# Patient Record
(demographics unavailable — no encounter records)

---

## 2024-10-16 NOTE — HISTORY OF PRESENT ILLNESS
[FreeTextEntry1] : ***Oct. 16, 2024*** Sydney feels well overall denies new complaints she is presently on Mounjaro 15mg qwk, metformin sporadically does endorse gi side effects, Tresiba 8u qam.  She is wearing a maria d PRO Date of download:  10/16/2024   Diabetes Medications and Dosage: as above   Indication for CGMS: verify a change in the treatment regimen, identify periods of hypoglycemia/ hyperglycemia.   Modal day report: pattern.   Pt with HYPO  0% of the time ( NONE below 54),  15% in target range   Hyperglycemia:  85% elevation including 29%   Identified issues: carbohydrate counting   dates analyzed:  09/27/2024 - 10/10/2024  Reports relatively good exercise regimen but is less than she usually does.   Lab review A1c 9.2%, HDL 49, LDL 80, Cr. 76, egfr  94,    HISTORY OF PRESENT ILLNESS ***Sept. 24, 2024*** Ms. CAMPBELL was diagnosed with Diabetes Mellitus Type 2 unsure of when she was diagnosed with T2DM (Earliest record 9.5% 2021) and arrives today for initial evaluation of glycemic control.   She reports history of HTN, dyslipidemia, and denies CAD, known complications of retinopathy, nephropathy, or neuropathy. She is presently on Mounjaro 15 mg qwk, Metformin 500 mg  takes sporadically (If I don't eat I don't need to take it.), Rosuvastatin 5mg qhs,  She was previously on Farxiga 5 mg qd it was discontinued due to immediate yeast infection.    Blood sugars are not checked.    Hypoglycemia frequency: Pt denies subjective HYPOs   Fingerstick glucose in the office today is 202 mg/dL  fasting.   Diet: not following ADA.    Exercise: WalkFit lupe 26 minutes 3-4x week,  (9 games a week)   Lab review: April 2024:  mg/dL, HDL 46 mg/dL Cr. 88, eGFR 78, A1c 8.4%   Last dilated eye -  Last podiatry visit  -  Last cardiology evaluation -  Last stress test -  Last 2-D Echo -  Last nephrology evaluation -  Last neurology evaluation-

## 2024-10-16 NOTE — PROCEDURE
[FreeTextEntry1] : General: In no acute distress.  Head: Normocephalic  Skin: Normal, no bruises. There are no cushingoid features  Eyes: No pallor, lid lag or orbitopathy.  Neck: No audible carotid bruit  Thyroid: palpable, non-tender. No audible bruit. Not enlarged. No palpable nodules. Pembertons negative  Lymph nodes: no palpable neck lymphadenopathy.  Chest: Lungs clear to auscultation  Heart: S1, S2, no murmurs  Abdomen: No tenderness, no masses, no striae  Neurological: Deep tendon reflexes symmetrical, 2+ (biceps, brachioradialis, patellar).  Lower Extremities: No edema, no cyanosis  Foot exam: No ulcers, no onychomycotic nail changes. Good pedal pulses. 10-g [place]-Khoa monofilament testing is normal bilaterally. Vibratory sensation with 128-Hz tuning fork is preserved bilaterally.

## 2024-10-16 NOTE — ASSESSMENT
Pt A&Ox2, incontinent of urine, external catheter in place. Very pleasant, following commands, max assist for transfer to chair stand and pivot. Continued to work with pt sitting and standing from chair to stedy, pt needed moderate assist for sit to stand with stedy.     Pt complained of pain to right wrist, ice placed and wrapped with ace bandage. Minimal edema noted, family stated looked a lot better. No significant weakness noted to left side pt granddaughter though his strength was more equal and closer to baseline.     Bath given, pt able to follow commands with washing self and oral care. Continuing to encourage pt to try and feed self. Tylenol given for generalized pain. Pt stated he had arthritis all over. Pt sitting up in chair with all safety measures in place, call device within reach and family at bedside.        [FreeTextEntry1] : T2DM uncontrolled, A1c 9. -LILLIE PRO results discussed today -Begin Lillie 3 sample provided, lupe downloaded -Blood work results discussed today -Increase Tresiba 20u qhs -Begin Metformin ER 500mg 2 tablets once a day   -Continue Mounjaro 15mg qwk -We discussed avoidance recognition and treatment of hypoglycemia -Continue rosuvastatin 5mg qhs  rtc 1 month     Diabetes Counseling: The patient was counseled on diabetes foot care, long term vascular complications of diabetes, carbohydrate consistent diet, importance of diet and exercise to improve glycemic control, achieve weight loss and improve cardiovascular health, exercise/effect on glucose, hypoglycemia management, glucagon use, ketone testing, action and use of short and long-acting insulin, self-monitoring of blood glucose, insulin self-administration, injection technique, storage, and sharps disposal and sick-day management.  Patient was referred to ophthalmology for retinopathy screening. Risks and benefits of diabetic medications were discussed.

## 2024-11-15 NOTE — PROCEDURE
[FreeTextEntry1] : General: In no acute distress.  Head: Normocephalic  Skin: Normal, no bruises. There are no cushingoid features  Eyes: No pallor, lid lag or orbitopathy.  Neck: No audible carotid bruit  Thyroid: palpable, non-tender. No audible bruit. Not enlarged. No palpable nodules. Pembertons negative  Lymph nodes: no palpable neck lymphadenopathy.  Chest: Lungs clear to auscultation  Heart: S1, S2, no murmurs  Abdomen: No tenderness, no masses, no striae  Neurological: Deep tendon reflexes symmetrical, 2+ (biceps, brachioradialis, patellar).  Lower Extremities: No edema, no cyanosis  Foot exam: No ulcers, no onychomycotic nail changes. Good pedal pulses. 10-g [place]-Khoa monofilament testing is normal bilaterally. Vibratory sensation with 128-Hz tuning fork is preserved bilaterally.

## 2024-11-15 NOTE — ASSESSMENT
[FreeTextEntry1] : T2DM uncontrolled, major improvement , not at goal -LILLIE 3 - 30mins post meal post meal exercise shows a clear and significant benefit on cgm data   -I advised post meal exercise lunch (as little as 10 minutes brisk walk) and dinner -Increase Tresiba 24u qhs -Increase Metformin ER 500mg 3 tablets once a day   -Continue Mounjaro 15mg qwk -We discussed avoidance recognition and treatment of hypoglycemia -Continue rosuvastatin 5mg qhs  rtc 2 months     Diabetes Counseling: The patient was counseled on diabetes foot care, long term vascular complications of diabetes, carbohydrate consistent diet, importance of diet and exercise to improve glycemic control, achieve weight loss and improve cardiovascular health, exercise/effect on glucose, hypoglycemia management, glucagon use, ketone testing, action and use of short and long-acting insulin, self-monitoring of blood glucose, insulin self-administration, injection technique, storage, and sharps disposal and sick-day management.  Patient was referred to ophthalmology for retinopathy screening. Risks and benefits of diabetic medications were discussed.

## 2024-11-15 NOTE — HISTORY OF PRESENT ILLNESS
[FreeTextEntry1] : ***Nov. 15, 2026*** Sydney feels well overall denies new complaints she is presently on Treisba 20 u qam, mounjaro 15mg qwk metformin 500mg 2-3 tablets daily with dinner, She is wearing a maria d 3 Date of download:  11/15/2024  Diabetes Medications and Dosage: as above  Indication for CGMS: verify a change in the treatment regimen, identify periods of hypoglycemia/ hyperglycemia.  Modal day report: pattern.  Pt with HYPO  0% of the time ( NONE below 54),  67% in target range  Hyperglycemia:  31% elevation including 2% > 250mg/dL  Identified issues: carbohydrate counting  dates analyzed:  11/02/2024 - 11/15/2024  ***Oct. 16, 2024*** Sydney feels well overall denies new complaints she is presently on Mounjaro 15mg qwk, metformin sporadically does endorse gi side effects, Tresiba 8u qam.  She is wearing a maria d PRO Date of download:  10/16/2024   Diabetes Medications and Dosage: as above   Indication for CGMS: verify a change in the treatment regimen, identify periods of hypoglycemia/ hyperglycemia.   Modal day report: pattern.   Pt with HYPO  0% of the time ( NONE below 54),  15% in target range   Hyperglycemia:  85% elevation including 29%   Identified issues: carbohydrate counting   dates analyzed:  09/27/2024 - 10/10/2024  Reports relatively good exercise regimen but is less than she usually does.   Lab review A1c 9.2%, HDL 49, LDL 80, Cr. 76, egfr  94,    HISTORY OF PRESENT ILLNESS ***Sept. 24, 2024*** Ms. CAMPBELL was diagnosed with Diabetes Mellitus Type 2 unsure of when she was diagnosed with T2DM (Earliest record 9.5% 2021) and arrives today for initial evaluation of glycemic control.   She reports history of HTN, dyslipidemia, and denies CAD, known complications of retinopathy, nephropathy, or neuropathy. She is presently on Mounjaro 15 mg qwk, Metformin 500 mg  takes sporadically (If I don't eat I don't need to take it.), Rosuvastatin 5mg qhs,  She was previously on Farxiga 5 mg qd it was discontinued due to immediate yeast infection.    Blood sugars are not checked.    Hypoglycemia frequency: Pt denies subjective HYPOs   Fingerstick glucose in the office today is 202 mg/dL  fasting.   Diet: not following ADA.    Exercise: WalkFit lupe 26 minutes 3-4x week,  (9 games a week)   Lab review: April 2024:  mg/dL, HDL 46 mg/dL Cr. 88, eGFR 78, A1c 8.4%   Last dilated eye -  Last podiatry visit  -  Last cardiology evaluation -  Last stress test -  Last 2-D Echo -  Last nephrology evaluation -  Last neurology evaluation-

## 2024-12-12 NOTE — ASSESSMENT
[FreeTextEntry1] : 2 DM : now followed by Endo on Tresiba in add to Mounjaro and metformin( SGLT2 gave her yeast infections immediately) , + proteinuria  3 htn controlled 4.asthma: controlled  5.hypercalcemia off her Ca needs repeat  6 HLD: now on  crestor 5 and check with follow up Ca.  7 JOY on oral appliance   4 mos  med renewed

## 2024-12-12 NOTE — HISTORY OF PRESENT ILLNESS
[FreeTextEntry1] : Here for follow up for her DM  [de-identified] : Now seeing Endo, now on insulin /tresibs, wears a CGM, sugars are better, now 154 at this moment.

## 2025-01-13 NOTE — ASSESSMENT
[FreeTextEntry1] : T2DM uncontrolled, poor diet -LILLIE 3 -I advised post meal exercise lunch (as little as 10 minutes brisk walk) and dinner -Increase Tresiba 27u qhs -Metformin ER 500mg 3 tablets once a day   - Mounjaro 15mg qwk -Continue rosuvastatin 5mg qhs -Must DC candy, late night carbs, sugary drinks. -Reduce or eliminate fast food especially chinese food which Pt eats frequently.  Better to have sauce on the side.   -I advised watch out for sauces on meat or vegetables as they contain a lot of sugar. IE bbq sauce  -Pt is going to look into a premade meal service for diabetes.     rtc 1 months     Diabetes Counseling: The patient was counseled on diabetes foot care, long term vascular complications of diabetes, carbohydrate consistent diet, importance of diet and exercise to improve glycemic control, achieve weight loss and improve cardiovascular health, exercise/effect on glucose, hypoglycemia management, glucagon use, ketone testing, action and use of short and long-acting insulin, self-monitoring of blood glucose, insulin self-administration, injection technique, storage, and sharps disposal and sick-day management.  Patient was referred to ophthalmology for retinopathy screening. Risks and benefits of diabetic medications were discussed.

## 2025-01-13 NOTE — HISTORY OF PRESENT ILLNESS
[FreeTextEntry1] : ***Jan. 13, 2025*** Sydney feels well overall denies new complaints.  She reports poor diet and sedentary lifestyle during the month of december.  She is presently on Tresiba 24 u qhs, Mounjaro 15mg qwk, Metformin  mg 3 tablets once daily SHe is wearing a Maria D 3 Date of download:  01/13/2025  Diabetes Medications and Dosage: as above  Indication for CGMS: verify a change in the treatment regimen, identify periods of hypoglycemia/ hyperglycemia.  Modal day report: pattern.  Pt with HYPO  0% of the time (NONE below 54),  49% in target range  Hyperglycemia:  51% elevation including 3% > 250mg/dL  Identified issues: Sugary drinks, creamer i ncoffee, fast food (chinese food) candy, starches, Late night carbs,   dates analyzed: 12/31/2024 - 01/13/2025  ***Nov. 15, 2026*** Sydney feels well overall denies new complaints she is presently on Treisba 20 u qam, mounjaro 15mg qwk metformin 500mg 2-3 tablets daily with dinner, She is wearing a maria d 3 Date of download:  11/15/2024  Diabetes Medications and Dosage: as above  Indication for CGMS: verify a change in the treatment regimen, identify periods of hypoglycemia/ hyperglycemia.  Modal day report: pattern.  Pt with HYPO  0% of the time ( NONE below 54),  67% in target range  Hyperglycemia:  31% elevation including 2% > 250mg/dL  Identified issues: carbohydrate counting  dates analyzed:  11/02/2024 - 11/15/2024  ***Oct. 16, 2024*** Sydney feels well overall denies new complaints she is presently on Mounjaro 15mg qwk, metformin sporadically does endorse gi side effects, Tresiba 8u qam.  She is wearing a maria d PRO Date of download:  10/16/2024   Diabetes Medications and Dosage: as above   Indication for CGMS: verify a change in the treatment regimen, identify periods of hypoglycemia/ hyperglycemia.   Modal day report: pattern.   Pt with HYPO  0% of the time ( NONE below 54),  15% in target range   Hyperglycemia:  85% elevation including 29%   Identified issues: carbohydrate counting   dates analyzed:  09/27/2024 - 10/10/2024  Reports relatively good exercise regimen but is less than she usually does.   Lab review A1c 9.2%, HDL 49, LDL 80, Cr. 76, egfr  94,    HISTORY OF PRESENT ILLNESS ***Sept. 24, 2024*** Ms. CAMPBELL was diagnosed with Diabetes Mellitus Type 2 unsure of when she was diagnosed with T2DM (Earliest record 9.5% 2021) and arrives today for initial evaluation of glycemic control.   She reports history of HTN, dyslipidemia, and denies CAD, known complications of retinopathy, nephropathy, or neuropathy. She is presently on Mounjaro 15 mg qwk, Metformin 500 mg  takes sporadically (If I don't eat I don't need to take it.), Rosuvastatin 5mg qhs,  She was previously on Farxiga 5 mg qd it was discontinued due to immediate yeast infection.    Blood sugars are not checked.    Hypoglycemia frequency: Pt denies subjective HYPOs   Fingerstick glucose in the office today is 202 mg/dL  fasting.   Diet: not following ADA.    Exercise: WalkFit lupe 26 minutes 3-4x week,  (9 games a week)   Lab review: April 2024:  mg/dL, HDL 46 mg/dL Cr. 88, eGFR 78, A1c 8.4%   Last dilated eye -  Last podiatry visit  -  Last cardiology evaluation -  Last stress test -  Last 2-D Echo -  Last nephrology evaluation -  Last neurology evaluation-

## 2025-01-13 NOTE — PROCEDURE
[FreeTextEntry1] : General: In no acute distress.  Head: Normocephalic  Skin: Normal, no bruises. There are no cushingoid features  Eyes: No pallor, lid lag or orbitopathy.  Neck: No audible carotid bruit  Thyroid: palpable, non-tender. No audible bruit. Not enlarged. No palpable nodules. Pembertons negative  Lymph nodes: no palpable neck lymphadenopathy.  Chest: Lungs clear to auscultation  Heart: S1, S2, no murmurs  Abdomen: No tenderness, no masses, no striae  Neurological: Deep tendon reflexes symmetrical, 2+ (biceps, brachioradialis, patellar).  Lower Extremities: No edema, no cyanosis  Foot exam: No ulcers, no onychomycotic nail changes. Good pedal pulses. 10-g [place]-Khoa monofilament testing is normal bilaterally. Vibratory sensation with 128-Hz tuning fork is preserved bilaterally.

## 2025-02-24 NOTE — PROCEDURE
[FreeTextEntry1] : General: In no acute distress.  Head: Normocephalic  Skin: Normal, no bruises. There are no cushingoid features  Eyes: No pallor, lid lag or orbitopathy.  Neck: No audible carotid bruit  Thyroid: palpable, non-tender. No audible bruit. Not enlarged. No palpable nodules. Pembertons negative  Lymph nodes: no palpable neck lymphadenopathy.  Chest: Lungs clear to auscultation  Heart: S1, S2, no murmurs  Abdomen: No tenderness, no masses, no striae  Neurological: Deep tendon reflexes symmetrical, 2+ (biceps, brachioradialis, patellar).  Lower Extremities: No edema, no cyanosis  Foot exam: No ulcers, no onychomycotic nail changes. Good pedal pulses. 10-g [place]-Khoa monofilament testing is normal bilaterally. Vibratory sensation with 128-Hz tuning fork is preserved bilaterally.

## 2025-02-24 NOTE — HISTORY OF PRESENT ILLNESS
[FreeTextEntry1] : ***Feb. 24, 2025*** Sydney feels well overall denies new complaints she is presently on Tresiba 28u qhs, Metformin ER 500mg 3 tablets qd,   Mounjaro 15mg qwk,.  She has increased physical activity, improved diet and is monitoring carb intake closely.  She does endorse late night snacking (after 12a) Date of download: 02/24/2025  Diabetes Medications and Dosage: as above  Indication for CGMS: verify a change in the treatment regimen, identify periods of hypoglycemia/ hyperglycemia.  Modal day report: pattern.  Pt with HYPO  0% of the time (NONE below 54),  79% in target range  Hyperglycemia:  21% elevation including 2% > 250mg/dL  Identified issues: carbohydrate counting  dates analyzed:  02/11/2025 - 02/24/2025 ***Jan. 13, 2025*** Sydney feels well overall denies new complaints.  She reports poor diet and sedentary lifestyle during the month of december.  She is presently on Tresiba 24 u qhs, Mounjaro 15mg qwk, Metformin  mg 3 tablets once daily SHe is wearing a Maria D 3 Date of download:  01/13/2025  Diabetes Medications and Dosage: as above  Indication for CGMS: verify a change in the treatment regimen, identify periods of hypoglycemia/ hyperglycemia.  Modal day report: pattern.  Pt with HYPO  0% of the time (NONE below 54),  49% in target range  Hyperglycemia:  51% elevation including 3% > 250mg/dL  Identified issues: Sugary drinks, creamer i ncoffee, fast food (chinese food) candy, starches, Late night carbs,   dates analyzed: 12/31/2024 - 01/13/2025  ***Nov. 15, 2026*** Sydney feels well overall denies new complaints she is presently on Treisba 20 u qam, mounjaro 15mg qwk metformin 500mg 2-3 tablets daily with dinner, She is wearing a maria d 3 Date of download:  11/15/2024  Diabetes Medications and Dosage: as above  Indication for CGMS: verify a change in the treatment regimen, identify periods of hypoglycemia/ hyperglycemia.  Modal day report: pattern.  Pt with HYPO  0% of the time ( NONE below 54),  67% in target range  Hyperglycemia:  31% elevation including 2% > 250mg/dL  Identified issues: carbohydrate counting  dates analyzed:  11/02/2024 - 11/15/2024  ***Oct. 16, 2024*** Sydney feels well overall denies new complaints she is presently on Mounjaro 15mg qwk, metformin sporadically does endorse gi side effects, Tresiba 8u qam.  She is wearing a maria d PRO Date of download:  10/16/2024   Diabetes Medications and Dosage: as above   Indication for CGMS: verify a change in the treatment regimen, identify periods of hypoglycemia/ hyperglycemia.   Modal day report: pattern.   Pt with HYPO  0% of the time ( NONE below 54),  15% in target range   Hyperglycemia:  85% elevation including 29%   Identified issues: carbohydrate counting   dates analyzed:  09/27/2024 - 10/10/2024  Reports relatively good exercise regimen but is less than she usually does.   Lab review A1c 9.2%, HDL 49, LDL 80, Cr. 76, egfr  94,    HISTORY OF PRESENT ILLNESS ***Sept. 24, 2024*** Ms. CAMPBELL was diagnosed with Diabetes Mellitus Type 2 unsure of when she was diagnosed with T2DM (Earliest record 9.5% 2021) and arrives today for initial evaluation of glycemic control.   She reports history of HTN, dyslipidemia, and denies CAD, known complications of retinopathy, nephropathy, or neuropathy. She is presently on Mounjaro 15 mg qwk, Metformin 500 mg  takes sporadically (If I don't eat I don't need to take it.), Rosuvastatin 5mg qhs,  She was previously on Farxiga 5 mg qd it was discontinued due to immediate yeast infection.    Blood sugars are not checked.    Hypoglycemia frequency: Pt denies subjective HYPOs   Fingerstick glucose in the office today is 202 mg/dL  fasting.   Diet: not following ADA.    Exercise: WalkFit lupe 26 minutes 3-4x week,  (9 games a week)   Lab review: April 2024:  mg/dL, HDL 46 mg/dL Cr. 88, eGFR 78, A1c 8.4%   Last dilated eye -  Last podiatry visit  -  Last cardiology evaluation -  Last stress test -  Last 2-D Echo -  Last nephrology evaluation -  Last neurology evaluation-

## 2025-02-24 NOTE — ASSESSMENT
[FreeTextEntry1] : T2DM improving diet and activity -LILLIE 3 -I advised post meal exercise lunch (as little as 10 minutes brisk walk) and dinner -Increase Tresiba 30 u qhs -Metformin ER 500mg 3 tablets once a day   - Mounjaro 15mg qwk -Continue rosuvastatin 5mg qhs -Must DC candy, late night carbs, sugary drinks. -Reduce or eliminate fast food especially chinese food which Pt eats frequently.  Better to have sauce on the side.   -I advised watch out for sauces on meat or vegetables as they contain a lot of sugar. IE bbq sauce  -Continue premade low carb    rtc 6 weeks     Diabetes Counseling: The patient was counseled on diabetes foot care, long term vascular complications of diabetes, carbohydrate consistent diet, importance of diet and exercise to improve glycemic control, achieve weight loss and improve cardiovascular health, exercise/effect on glucose, hypoglycemia management, glucagon use, ketone testing, action and use of short and long-acting insulin, self-monitoring of blood glucose, insulin self-administration, injection technique, storage, and sharps disposal and sick-day management.  Patient was referred to ophthalmology for retinopathy screening. Risks and benefits of diabetic medications were discussed.

## 2025-04-10 NOTE — HISTORY OF PRESENT ILLNESS
[FreeTextEntry1] : ***Apr. 10, 2025*** Sydney feels well overal ldenies new complaints reports improved blood sugars, reports exercise everyday walking in a club 3 miles  ***Feb. 24, 2025*** Sydney feels well overall denies new complaints she is presently on Tresiba 28u qhs, Metformin ER 500mg 3 tablets qd,   Mounjaro 15mg qwk,.  She has increased physical activity, improved diet and is monitoring carb intake closely.  She does endorse late night snacking (after 12a) Date of download: 02/24/2025  Diabetes Medications and Dosage: as above  Indication for CGMS: verify a change in the treatment regimen, identify periods of hypoglycemia/ hyperglycemia.  Modal day report: pattern.  Pt with HYPO  0% of the time (NONE below 54),  79% in target range  Hyperglycemia:  21% elevation including 2% > 250mg/dL  Identified issues: carbohydrate counting  dates analyzed:  02/11/2025 - 02/24/2025 ***Jan. 13, 2025*** Sydney feels well overall denies new complaints.  She reports poor diet and sedentary lifestyle during the month of december.  She is presently on Tresiba 24 u qhs, Mounjaro 15mg qwk, Metformin  mg 3 tablets once daily SHe is wearing a Maria D 3 Date of download:  01/13/2025  Diabetes Medications and Dosage: as above  Indication for CGMS: verify a change in the treatment regimen, identify periods of hypoglycemia/ hyperglycemia.  Modal day report: pattern.  Pt with HYPO  0% of the time (NONE below 54),  49% in target range  Hyperglycemia:  51% elevation including 3% > 250mg/dL  Identified issues: Sugary drinks, creamer i ncoffee, fast food (chinese food) candy, starches, Late night carbs,   dates analyzed: 12/31/2024 - 01/13/2025  ***Nov. 15, 2026*** Sydney feels well overall denies new complaints she is presently on Treisba 20 u qam, mounjaro 15mg qwk metformin 500mg 2-3 tablets daily with dinner, She is wearing a maria d 3 Date of download:  11/15/2024  Diabetes Medications and Dosage: as above  Indication for CGMS: verify a change in the treatment regimen, identify periods of hypoglycemia/ hyperglycemia.  Modal day report: pattern.  Pt with HYPO  0% of the time ( NONE below 54),  67% in target range  Hyperglycemia:  31% elevation including 2% > 250mg/dL  Identified issues: carbohydrate counting  dates analyzed:  11/02/2024 - 11/15/2024  ***Oct. 16, 2024*** Sydney feels well overall denies new complaints she is presently on Mounjaro 15mg qwk, metformin sporadically does endorse gi side effects, Tresiba 8u qam.  She is wearing a maria d PRO Date of download:  10/16/2024   Diabetes Medications and Dosage: as above   Indication for CGMS: verify a change in the treatment regimen, identify periods of hypoglycemia/ hyperglycemia.   Modal day report: pattern.   Pt with HYPO  0% of the time ( NONE below 54),  15% in target range   Hyperglycemia:  85% elevation including 29%   Identified issues: carbohydrate counting   dates analyzed:  09/27/2024 - 10/10/2024  Reports relatively good exercise regimen but is less than she usually does.   Lab review A1c 9.2%, HDL 49, LDL 80, Cr. 76, egfr  94,    HISTORY OF PRESENT ILLNESS ***Sept. 24, 2024*** Ms. CAMPBELL was diagnosed with Diabetes Mellitus Type 2 unsure of when she was diagnosed with T2DM (Earliest record 9.5% 2021) and arrives today for initial evaluation of glycemic control.   She reports history of HTN, dyslipidemia, and denies CAD, known complications of retinopathy, nephropathy, or neuropathy. She is presently on Mounjaro 15 mg qwk, Metformin 500 mg  takes sporadically (If I don't eat I don't need to take it.), Rosuvastatin 5mg qhs,  She was previously on Farxiga 5 mg qd it was discontinued due to immediate yeast infection.    Blood sugars are not checked.    Hypoglycemia frequency: Pt denies subjective HYPOs   Fingerstick glucose in the office today is 202 mg/dL  fasting.   Diet: not following ADA.    Exercise: [employer] lupe 26 minutes 3-4x week,  (9 games a week)   Lab review: April 2024:  mg/dL, HDL 46 mg/dL Cr. 88, eGFR 78, A1c 8.4%   Last dilated eye -  Last podiatry visit  -  Last cardiology evaluation -  Last stress test -  Last 2-D Echo -  Last nephrology evaluation -  Last neurology evaluation-

## 2025-04-10 NOTE — HEALTH RISK ASSESSMENT
[Good] : ~his/her~  mood as  good [2 - 4 times a month (2 pts)] : 2-4 times a month (2 points) [1 or 2 (0 pts)] : 1 or 2 (0 points) [No falls in past year] : Patient reported no falls in the past year [Little interest or pleasure doing things] : 1) Little interest or pleasure doing things [Feeling down, depressed, or hopeless] : 2) Feeling down, depressed, or hopeless [0] : 2) Feeling down, depressed, or hopeless: Not at all (0) [PHQ-2 Negative - No further assessment needed] : PHQ-2 Negative - No further assessment needed [Yes] : takes [No] : Did not review medication list for presence of high-risk medications. [Never] : Never [NO] : No [No Retinopathy] : No retinopathy [None] : None [With Significant Other] : lives with significant other [Employed] : employed [College] : College [# Of Children ___] : has [unfilled] children [Feels Safe at Home] : Feels safe at home [Fully functional (bathing, dressing, toileting, transferring, walking, feeding)] : Fully functional (bathing, dressing, toileting, transferring, walking, feeding) [Fully functional (using the telephone, shopping, preparing meals, housekeeping, doing laundry, using] : Fully functional and needs no help or supervision to perform IADLs (using the telephone, shopping, preparing meals, housekeeping, doing laundry, using transportation, managing medications and managing finances) [Smoke Detector] : smoke detector [Carbon Monoxide Detector] : carbon monoxide detector [Seat Belt] :  uses seat belt [With Patient/Caregiver] : , with patient/caregiver [Audit-CScore] : 2 [de-identified] : exer regularly  [facility] [GCR9Pxmpl] : 0 [EyeExamDate] : 10/24 [MammogramDate] : 2022 [PapSmearDate] : 12/24 [AdvancecareDate] : 4.10.25

## 2025-04-10 NOTE — ASSESSMENT
[Vaccines Reviewed] : Immunizations reviewed today. Please see immunization details in the vaccine log within the immunization flowsheet.  [FreeTextEntry1] : 2 DM : now followed by Endo on Tresiba in add to Mounjaro and metformin( SGLT2 gave her yeast infections immediately) , + proteinuria  much improved now a1c toay was 6.5!!! with plans to eventually taper off insulin if possible 3 htn controlled 4.asthma: controlled  5.hypercalcemia is resolved  6 HLD: now on  crestor 5  142/52/69 would perfer to wait and on a lot of other meds  7 JOY on oral appliance 8 mammo rx written, no menopausal off depo OC. received flu, shingrix x 2 and pna 13 received will get the pcv 20   4 mos  med renewed

## 2025-04-10 NOTE — ASSESSMENT
[FreeTextEntry1] : T2DM improving diet and activity -LILLIE 3  -Increase Tresiba 32 u qhs -Increase Metformin ER 500mg 4 tablets once a day   - Mounjaro 15mg qwk -Continue rosuvastatin 5mg qhs -Must DC candy, late night carbs, sugary drinks. -Reduce or eliminate fast food especially chinese food which Pt eats frequently.  Better to have sauce on the side.   -I advised watch out for sauces on meat or vegetables as they contain a lot of sugar. IE bbq sauce  -Continue premade low carb    rtc 6 weeks     Diabetes Counseling: The patient was counseled on diabetes foot care, long term vascular complications of diabetes, carbohydrate consistent diet, importance of diet and exercise to improve glycemic control, achieve weight loss and improve cardiovascular health, exercise/effect on glucose, hypoglycemia management, glucagon use, ketone testing, action and use of short and long-acting insulin, self-monitoring of blood glucose, insulin self-administration, injection technique, storage, and sharps disposal and sick-day management.  Patient was referred to ophthalmology for retinopathy screening. Risks and benefits of diabetic medications were discussed.

## 2025-04-10 NOTE — HISTORY OF PRESENT ILLNESS
[FreeTextEntry1] : Here for annual PE  [de-identified] : DM is much better, last a1c today was 6.5, now on Tresiba, metformin and mounjaro 15

## 2025-04-10 NOTE — PROCEDURE
[FreeTextEntry1] : General: In no acute distress.  Head: Normocephalic  Skin: Normal, no bruises. There are no cushingoid features  Eyes: No pallor, lid lag or orbitopathy.  Neck: No audible carotid bruit  Thyroid: palpable, non-tender. No audible bruit. Not enlarged. No palpable nodules. Pembertons negative  Lymph nodes: no palpable neck lymphadenopathy.  Chest: Lungs clear to auscultation  Heart: S1, S2, no murmurs  Abdomen: No tenderness, no masses, no striae  Neurological: Deep tendon reflexes symmetrical, 2+ (biceps, brachioradialis, patellar).  Lower Extremities: No edema, no cyanosis  Foot exam: No ulcers, no onychomycotic nail changes. Good pedal pulses. 10-g [facility] monofilament testing is normal bilaterally. Vibratory sensation with 128-Hz tuning fork is preserved bilaterally.